# Patient Record
Sex: FEMALE | Race: BLACK OR AFRICAN AMERICAN | NOT HISPANIC OR LATINO | Employment: STUDENT | URBAN - METROPOLITAN AREA
[De-identification: names, ages, dates, MRNs, and addresses within clinical notes are randomized per-mention and may not be internally consistent; named-entity substitution may affect disease eponyms.]

---
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
## 2025-05-31 ENCOUNTER — OFFICE VISIT (OUTPATIENT)
Dept: URGENT CARE | Facility: CLINIC | Age: 20
End: 2025-05-31
Payer: COMMERCIAL

## 2025-05-31 VITALS
BODY MASS INDEX: 17.61 KG/M2 | RESPIRATION RATE: 18 BRPM | DIASTOLIC BLOOD PRESSURE: 74 MMHG | HEIGHT: 66 IN | OXYGEN SATURATION: 98 % | SYSTOLIC BLOOD PRESSURE: 114 MMHG | WEIGHT: 109.56 LBS | HEART RATE: 60 BPM | TEMPERATURE: 99 F

## 2025-05-31 DIAGNOSIS — R09.81 NASAL CONGESTION: Primary | ICD-10-CM

## 2025-05-31 LAB
CTP QC/QA: YES
CTP QC/QA: YES
MOLECULAR STREP A: NEGATIVE
SARS-COV+SARS-COV-2 AG RESP QL IA.RAPID: NEGATIVE

## 2025-05-31 PROCEDURE — 87651 STREP A DNA AMP PROBE: CPT | Mod: QW,S$GLB,, | Performed by: NURSE PRACTITIONER

## 2025-05-31 PROCEDURE — 87811 SARS-COV-2 COVID19 W/OPTIC: CPT | Mod: QW,S$GLB,, | Performed by: NURSE PRACTITIONER

## 2025-05-31 PROCEDURE — 99213 OFFICE O/P EST LOW 20 MIN: CPT | Mod: S$GLB,,, | Performed by: NURSE PRACTITIONER

## 2025-05-31 NOTE — PATIENT INSTRUCTIONS
"Nasal congestion  -     SARS Coronavirus 2 Antigen, POCT Manual Read  -     POCT Strep A, Molecular      Viral URI (upper respiratory infection):    Your symptoms are viral in nature.  Viral upper respiratory infections typically run their course in 7-10 days.     - Rest at home.     - Drink plenty of fluids so you won't get dehydrated.      Avoid taking Decongestants such as pseudoephedrine (ex. Sudafed) or phenylephrine (ex. Mucinex FastMax, Dayquil, Nyquil, or any combo cold meds that say "cold," "sinus" or "-D").        - Cough recommendations:   Warm tea with honey can help with cough. Honey is a natural cough suppressant.  - Dextromethorphan (DM) is a cough suppressant over the counter (ie. mucinex DM OR delsym).        - Congestion recommendations:      - Mucinex (guaifenesin) twice a day (or as directed) to help loosen mucous.       - Fever/Pain recommendations:  Alternate Tylenol or Ibuprofen as directed for fever/pain.   - Motrin/Ibuprofen every 6-8 hours for pain and inflammation. Do not take ibuprofen if you have a history of GI bleeding, kidney disease, or if you take blood thinners.    - Tylenol/acetaminophen every 6-8 hours for added pain relief.  Avoid tylenol if you have a history of liver disease.       -Sore throat recommendations: Warm fluids, warm salt water gargles, throat lozenges, tea, honey, soup, or drinking something cold or frozen.  Throat lozenges or sprays help reduce pain. Gargling with warm saltwater (1/4 teaspoon of salt in 1/2 cup of warm water) or an OTC anesthetic gargle may be useful for irritation.    When to seek medical advice  Call your healthcare provider right away if any of these occur:  Fever that is poorly controlled with OTC fever reducing medication  New or worsening ear pain, sinus pain, or headache  Stiff neck  You can't swallow liquids or you can't open your mouth wide because of throat pain  Signs of dehydration. These include very dark urine or no urine, sunken " eyes, and dizziness.  Trouble breathing or noisy breathing  Muffled voice  Rash     If your symptoms worsen or fail to improve you should go to Emergency Department.

## 2025-05-31 NOTE — PROGRESS NOTES
"Subjective:      Patient ID: Amanda López is a 20 y.o. female.    Vitals:  height is 5' 6" (1.676 m) and weight is 49.7 kg (109 lb 9.1 oz). Her oral temperature is 98.7 °F (37.1 °C). Her blood pressure is 114/74 and her pulse is 60. Her respiration is 18 and oxygen saturation is 98%.     Chief Complaint: Sinus Problem    Pt is a 20 year old female presenting with complaint of headache, sinus pressure, slight sore throat, and nasal congestion,  Onset was yesterday evening. Pt took theraflu and tylenol. Pt is a camp counselor for .     Sinus Problem  This is a new problem. The current episode started yesterday. The problem has been gradually worsening since onset. Associated symptoms include chills, congestion, headaches, sinus pressure, sneezing and a sore throat. Pertinent negatives include no coughing, ear pain or shortness of breath. Past treatments include acetaminophen. The treatment provided mild relief.       Constitution: Positive for chills. Negative for fatigue and fever.   HENT:  Positive for congestion, sinus pressure and sore throat. Negative for ear pain and sinus pain.    Cardiovascular:  Negative for chest pain.   Respiratory:  Negative for cough, sputum production and shortness of breath.    Gastrointestinal:  Negative for nausea, vomiting and diarrhea.   Musculoskeletal:  Negative for muscle ache.   Allergic/Immunologic: Positive for sneezing.   Neurological:  Positive for headaches.      Objective:     Physical Exam   Constitutional: She is oriented to person, place, and time.   HENT:   Head: Normocephalic.   Ears:   Right Ear: Hearing and external ear normal. No no drainage, swelling or tenderness. Tympanic membrane is not erythematous, not retracted and not bulging. No middle ear effusion.   Left Ear: Hearing and external ear normal. No no drainage, swelling or tenderness. Tympanic membrane is not erythematous, not retracted and not bulging.  No middle ear effusion.   Nose: Nose " "normal. No mucosal edema, rhinorrhea or purulent discharge. Right sinus exhibits no maxillary sinus tenderness and no frontal sinus tenderness. Left sinus exhibits no maxillary sinus tenderness and no frontal sinus tenderness.   Mouth/Throat: Uvula is midline and mucous membranes are normal. No trismus in the jaw. No uvula swelling. Posterior oropharyngeal erythema present. No oropharyngeal exudate or posterior oropharyngeal edema. Tonsils are 4+ on the right. Tonsils are 4+ on the left. No tonsillar exudate.   Cardiovascular: Normal rate and regular rhythm.   Pulmonary/Chest: Effort normal and breath sounds normal.   Neurological: She is alert and oriented to person, place, and time.   Skin: Skin is warm and dry.   Nursing note and vitals reviewed.      Assessment:     1. Nasal congestion        Plan:       Nasal congestion  -     SARS Coronavirus 2 Antigen, POCT Manual Read  -     POCT Strep A, Molecular      Patient Instructions   Nasal congestion  -     SARS Coronavirus 2 Antigen, POCT Manual Read  -     POCT Strep A, Molecular      Viral URI (upper respiratory infection):    Your symptoms are viral in nature.  Viral upper respiratory infections typically run their course in 7-10 days.     - Rest at home.     - Drink plenty of fluids so you won't get dehydrated.      Avoid taking Decongestants such as pseudoephedrine (ex. Sudafed) or phenylephrine (ex. Mucinex FastMax, Dayquil, Nyquil, or any combo cold meds that say "cold," "sinus" or "-D").        - Cough recommendations:   Warm tea with honey can help with cough. Honey is a natural cough suppressant.  - Dextromethorphan (DM) is a cough suppressant over the counter (ie. mucinex DM OR delsym).        - Congestion recommendations:      - Mucinex (guaifenesin) twice a day (or as directed) to help loosen mucous.       - Fever/Pain recommendations:  Alternate Tylenol or Ibuprofen as directed for fever/pain.   - Motrin/Ibuprofen every 6-8 hours for pain and " inflammation. Do not take ibuprofen if you have a history of GI bleeding, kidney disease, or if you take blood thinners.    - Tylenol/acetaminophen every 6-8 hours for added pain relief.  Avoid tylenol if you have a history of liver disease.       -Sore throat recommendations: Warm fluids, warm salt water gargles, throat lozenges, tea, honey, soup, or drinking something cold or frozen.  Throat lozenges or sprays help reduce pain. Gargling with warm saltwater (1/4 teaspoon of salt in 1/2 cup of warm water) or an OTC anesthetic gargle may be useful for irritation.    When to seek medical advice  Call your healthcare provider right away if any of these occur:  Fever that is poorly controlled with OTC fever reducing medication  New or worsening ear pain, sinus pain, or headache  Stiff neck  You can't swallow liquids or you can't open your mouth wide because of throat pain  Signs of dehydration. These include very dark urine or no urine, sunken eyes, and dizziness.  Trouble breathing or noisy breathing  Muffled voice  Rash     If your symptoms worsen or fail to improve you should go to Emergency Department.